# Patient Record
Sex: MALE | Race: WHITE | NOT HISPANIC OR LATINO | Employment: OTHER | URBAN - METROPOLITAN AREA
[De-identification: names, ages, dates, MRNs, and addresses within clinical notes are randomized per-mention and may not be internally consistent; named-entity substitution may affect disease eponyms.]

---

## 2024-09-23 ENCOUNTER — APPOINTMENT (OUTPATIENT)
Dept: CT IMAGING | Facility: CLINIC | Age: 39
End: 2024-09-23
Attending: EMERGENCY MEDICINE
Payer: COMMERCIAL

## 2024-09-23 ENCOUNTER — HOSPITAL ENCOUNTER (EMERGENCY)
Facility: CLINIC | Age: 39
Discharge: HOME | End: 2024-09-23
Attending: EMERGENCY MEDICINE | Admitting: EMERGENCY MEDICINE
Payer: COMMERCIAL

## 2024-09-23 VITALS
OXYGEN SATURATION: 95 % | BODY MASS INDEX: 32.35 KG/M2 | TEMPERATURE: 96.5 F | DIASTOLIC BLOOD PRESSURE: 103 MMHG | RESPIRATION RATE: 18 BRPM | HEART RATE: 87 BPM | WEIGHT: 206.13 LBS | SYSTOLIC BLOOD PRESSURE: 150 MMHG | HEIGHT: 67 IN

## 2024-09-23 DIAGNOSIS — K70.10 ALCOHOLIC HEPATITIS WITHOUT ASCITES (H): ICD-10-CM

## 2024-09-23 DIAGNOSIS — K52.9 COLITIS: ICD-10-CM

## 2024-09-23 DIAGNOSIS — F10.20 ALCOHOL USE DISORDER, SEVERE, DEPENDENCE (H): ICD-10-CM

## 2024-09-23 LAB
ALBUMIN SERPL BCG-MCNC: 4.6 G/DL (ref 3.5–5.2)
ALP SERPL-CCNC: 99 U/L (ref 40–150)
ALT SERPL W P-5'-P-CCNC: 144 U/L (ref 0–70)
ANION GAP SERPL CALCULATED.3IONS-SCNC: 17 MMOL/L (ref 7–15)
AST SERPL W P-5'-P-CCNC: 163 U/L (ref 0–45)
BASOPHILS # BLD AUTO: 0.1 10E3/UL (ref 0–0.2)
BASOPHILS NFR BLD AUTO: 1 %
BILIRUB SERPL-MCNC: 1.1 MG/DL
BUN SERPL-MCNC: 7.4 MG/DL (ref 6–20)
CALCIUM SERPL-MCNC: 9.3 MG/DL (ref 8.8–10.4)
CHLORIDE SERPL-SCNC: 102 MMOL/L (ref 98–107)
CREAT SERPL-MCNC: 0.88 MG/DL (ref 0.67–1.17)
EGFRCR SERPLBLD CKD-EPI 2021: >90 ML/MIN/1.73M2
EOSINOPHIL # BLD AUTO: 0.1 10E3/UL (ref 0–0.7)
EOSINOPHIL NFR BLD AUTO: 1 %
ERYTHROCYTE [DISTWIDTH] IN BLOOD BY AUTOMATED COUNT: 12.5 % (ref 10–15)
ETHANOL SERPL-MCNC: <0.01 G/DL
GLUCOSE SERPL-MCNC: 121 MG/DL (ref 70–99)
HCO3 SERPL-SCNC: 22 MMOL/L (ref 22–29)
HCT VFR BLD AUTO: 44.6 % (ref 40–53)
HGB BLD-MCNC: 16.3 G/DL (ref 13.3–17.7)
HOLD SPECIMEN: NORMAL
IMM GRANULOCYTES # BLD: 0 10E3/UL
IMM GRANULOCYTES NFR BLD: 1 %
INR PPP: 0.87 (ref 0.85–1.15)
LIPASE SERPL-CCNC: 47 U/L (ref 13–60)
LYMPHOCYTES # BLD AUTO: 1.5 10E3/UL (ref 0.8–5.3)
LYMPHOCYTES NFR BLD AUTO: 17 %
MCH RBC QN AUTO: 35.7 PG (ref 26.5–33)
MCHC RBC AUTO-ENTMCNC: 36.5 G/DL (ref 31.5–36.5)
MCV RBC AUTO: 98 FL (ref 78–100)
MONOCYTES # BLD AUTO: 0.8 10E3/UL (ref 0–1.3)
MONOCYTES NFR BLD AUTO: 10 %
NEUTROPHILS # BLD AUTO: 6.2 10E3/UL (ref 1.6–8.3)
NEUTROPHILS NFR BLD AUTO: 71 %
NRBC # BLD AUTO: 0 10E3/UL
NRBC BLD AUTO-RTO: 0 /100
PLATELET # BLD AUTO: 225 10E3/UL (ref 150–450)
POTASSIUM SERPL-SCNC: 3.7 MMOL/L (ref 3.4–5.3)
PROT SERPL-MCNC: 7.5 G/DL (ref 6.4–8.3)
RBC # BLD AUTO: 4.56 10E6/UL (ref 4.4–5.9)
SODIUM SERPL-SCNC: 141 MMOL/L (ref 135–145)
WBC # BLD AUTO: 8.8 10E3/UL (ref 4–11)

## 2024-09-23 PROCEDURE — 250N000011 HC RX IP 250 OP 636: Performed by: EMERGENCY MEDICINE

## 2024-09-23 PROCEDURE — 258N000003 HC RX IP 258 OP 636: Performed by: EMERGENCY MEDICINE

## 2024-09-23 PROCEDURE — 99285 EMERGENCY DEPT VISIT HI MDM: CPT | Mod: 25

## 2024-09-23 PROCEDURE — 82077 ASSAY SPEC XCP UR&BREATH IA: CPT | Performed by: EMERGENCY MEDICINE

## 2024-09-23 PROCEDURE — 74177 CT ABD & PELVIS W/CONTRAST: CPT

## 2024-09-23 PROCEDURE — 96361 HYDRATE IV INFUSION ADD-ON: CPT

## 2024-09-23 PROCEDURE — 83690 ASSAY OF LIPASE: CPT | Performed by: EMERGENCY MEDICINE

## 2024-09-23 PROCEDURE — 85049 AUTOMATED PLATELET COUNT: CPT | Performed by: EMERGENCY MEDICINE

## 2024-09-23 PROCEDURE — 80053 COMPREHEN METABOLIC PANEL: CPT | Performed by: EMERGENCY MEDICINE

## 2024-09-23 PROCEDURE — 250N000009 HC RX 250: Performed by: EMERGENCY MEDICINE

## 2024-09-23 PROCEDURE — 36415 COLL VENOUS BLD VENIPUNCTURE: CPT | Performed by: EMERGENCY MEDICINE

## 2024-09-23 PROCEDURE — 85610 PROTHROMBIN TIME: CPT | Performed by: EMERGENCY MEDICINE

## 2024-09-23 PROCEDURE — 96374 THER/PROPH/DIAG INJ IV PUSH: CPT | Mod: 59

## 2024-09-23 RX ORDER — IOPAMIDOL 755 MG/ML
500 INJECTION, SOLUTION INTRAVASCULAR ONCE
Status: COMPLETED | OUTPATIENT
Start: 2024-09-23 | End: 2024-09-23

## 2024-09-23 RX ORDER — LORAZEPAM 2 MG/ML
1 INJECTION INTRAMUSCULAR ONCE
Status: COMPLETED | OUTPATIENT
Start: 2024-09-23 | End: 2024-09-23

## 2024-09-23 RX ADMIN — IOPAMIDOL 100 ML: 755 INJECTION, SOLUTION INTRAVENOUS at 09:55

## 2024-09-23 RX ADMIN — SODIUM CHLORIDE 65 ML: 9 INJECTION, SOLUTION INTRAVENOUS at 09:55

## 2024-09-23 RX ADMIN — SODIUM CHLORIDE, POTASSIUM CHLORIDE, SODIUM LACTATE AND CALCIUM CHLORIDE 1000 ML: 600; 310; 30; 20 INJECTION, SOLUTION INTRAVENOUS at 08:57

## 2024-09-23 RX ADMIN — LORAZEPAM 1 MG: 2 INJECTION INTRAMUSCULAR; INTRAVENOUS at 09:42

## 2024-09-23 ASSESSMENT — COLUMBIA-SUICIDE SEVERITY RATING SCALE - C-SSRS
6. HAVE YOU EVER DONE ANYTHING, STARTED TO DO ANYTHING, OR PREPARED TO DO ANYTHING TO END YOUR LIFE?: NO
1. IN THE PAST MONTH, HAVE YOU WISHED YOU WERE DEAD OR WISHED YOU COULD GO TO SLEEP AND NOT WAKE UP?: NO
2. HAVE YOU ACTUALLY HAD ANY THOUGHTS OF KILLING YOURSELF IN THE PAST MONTH?: NO

## 2024-09-23 ASSESSMENT — ACTIVITIES OF DAILY LIVING (ADL)
ADLS_ACUITY_SCORE: 33
ADLS_ACUITY_SCORE: 35
ADLS_ACUITY_SCORE: 35

## 2024-09-23 NOTE — ED PROVIDER NOTES
"  Emergency Department Note      History of Present Illness     Chief Complaint   Alcohol Withdrawal and Melena    HPI   Quinten Madison is a 39 year old male with history of alcohol abuse who presents for melena and alcohol concerns. Patient reports that he had two episodes of bloody stool this morning and says that blood filled the toilet bowl. Patient is also having some abdominal discomfort and nausea. No vomiting. He did not have any bloody stool yesterday. No bruising, bleeding, chest pain, or shortness of breath. Patient says that he has had a problem with alcohol use for the past year but has been drinking daily for the past three weeks. 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     Independent Historian   None    Review of External Notes   Reviewed case management note from June 14, 2023. Reviewed note from December 15, 2023 for alcohol abuse.     Past Medical History     Medical History and Problem List   Alcohol-induced insomnia   Hyperlipidemia  Alcohol abuse    Medications   Trazodone  Pepcid     Physical Exam     Patient Vitals for the past 24 hrs:   BP Temp Pulse Resp SpO2 Height Weight   09/23/24 0754 (!) 167/117 (!) 96.5  F (35.8  C) 86 18 98 % 1.702 m (5' 7\") 93.5 kg (206 lb 2.1 oz)     Physical Exam  Constitutional: Patient is well appearing. No distress.  Head: Atraumatic.  Mouth/Throat: Oropharynx is clear and moist. No oropharyngeal exudate.  Eyes: Conjunctivae and EOM are normal. No scleral icterus.  Neck: Normal range of motion. Neck supple.   Cardiovascular: Normal rate, regular rhythm, normal heart sounds and intact distal perfusion.   Pulmonary/Chest: Breath sounds normal. No respiratory distress.  Abdominal: Soft. Bowel sounds are normal. No distension. No tenderness. No rebound or guarding.   Rectal no large masses hemorrhoids or fissures.  Slight residual blood at rectum  Musculoskeletal: Normal range of motion. No edema " or tenderness.   Neurological: Alert and orientated to person, place, and time. No observable focal neuro deficit  Skin: Warm and dry. No rash noted. Not diaphoretic.      Diagnostics     Lab Results   Labs Ordered and Resulted from Time of ED Arrival to Time of ED Departure   COMPREHENSIVE METABOLIC PANEL - Abnormal       Result Value    Sodium 141      Potassium 3.7      Carbon Dioxide (CO2) 22      Anion Gap 17 (*)     Urea Nitrogen 7.4      Creatinine 0.88      GFR Estimate >90      Calcium 9.3      Chloride 102      Glucose 121 (*)     Alkaline Phosphatase 99       (*)      (*)     Protein Total 7.5      Albumin 4.6      Bilirubin Total 1.1     CBC WITH PLATELETS AND DIFFERENTIAL - Abnormal    WBC Count 8.8      RBC Count 4.56      Hemoglobin 16.3      Hematocrit 44.6      MCV 98      MCH 35.7 (*)     MCHC 36.5      RDW 12.5      Platelet Count 225      % Neutrophils 71      % Lymphocytes 17      % Monocytes 10      % Eosinophils 1      % Basophils 1      % Immature Granulocytes 1      NRBCs per 100 WBC 0      Absolute Neutrophils 6.2      Absolute Lymphocytes 1.5      Absolute Monocytes 0.8      Absolute Eosinophils 0.1      Absolute Basophils 0.1      Absolute Immature Granulocytes 0.0      Absolute NRBCs 0.0     LIPASE - Normal    Lipase 47     INR - Normal    INR 0.87     ETHYL ALCOHOL LEVEL - Normal    Alcohol ethyl <0.01       Imaging   CT Abdomen Pelvis w Contrast   Preliminary Result   IMPRESSION:    1.  Mild wall prominence of the distal colon may just relate to   nondistention but a mild colitis is possible. No abscess or free air.   2.  Diffuse fatty liver.        EKG   None     Independent Interpretation   Ct abd no large masses obstruction free fluid or free air    ED Course      Medications Administered   Medications   lactated ringers BOLUS 1,000 mL (1,000 mLs Intravenous $New Bag 9/23/24 1229)   LORazepam (ATIVAN) injection 1 mg (1 mg Intravenous $Given 9/23/24 4610)   CT SCAN  FLUSH (65 mLs Intravenous $Given 9/23/24 0955)   iopamidol (ISOVUE-370) solution 500 mL (100 mLs Intravenous $Given 9/23/24 0955)     Procedures   None      Discussion of Management   None     ED Course   ED Course as of 09/23/24 1113   Mon Sep 23, 2024   0918 I evaluated the patient, obtained history, and performed a physical exam as detailed above.    1047 I rechecked on the patient and updated on plan of care.      Additional Documentation  None    Medical Decision Making / Diagnosis     CMS Diagnoses: None    MIPS   None    MDM   Quinten Madison is a 39 year old male with long history of etoh issues in non acute withdrawal without seizures or DT or hallucinations.  Has a mild nonspecific colitis.  Without elev WBC ro diff.  Chronic alcoholic hepatitis.  And no severe metabolic dernagement.  Long discussions on alcohol detox treatment options and ability to not stop on his own.  We reviewed all medical and mental health discussions with kimo present.  He has chosen to go home.      Disposition   The patient was discharged.     Diagnosis     ICD-10-CM    1. Alcohol use disorder, severe, dependence (H)  F10.20       2. Colitis  K52.9       3. Alcoholic hepatitis without ascites (H28)  K70.10            Discharge Medications   New Prescriptions    No medications on file     Scribe Disclosure:  I, Angeles Linares, am serving as a scribe at 8:52 AM on 9/23/2024 to document services personally performed by Antonio Engle MD based on my observations and the provider's statements to me.        Antonio Engle MD  09/23/24 8459

## 2024-09-23 NOTE — ED TRIAGE NOTES
Presents with BRB in stool X2 this AM. HX: Etoh withdrawal, denies seizures. Has been drinking abut 6 vodka drinks daily for the last 3 weeks. Last drink 2030 last night. Reports nausea, sweats, anxiety, mild tremors. Does not want to go to detox.      Triage Assessment (Adult)       Row Name 09/23/24 0755          Triage Assessment    Airway WDL WDL        Respiratory WDL    Respiratory WDL WDL        Skin Circulation/Temperature WDL    Skin Circulation/Temperature WDL WDL        Cardiac WDL    Cardiac WDL WDL        Peripheral/Neurovascular WDL    Peripheral Neurovascular WDL WDL        Cognitive/Neuro/Behavioral WDL    Cognitive/Neuro/Behavioral WDL WDL